# Patient Record
(demographics unavailable — no encounter records)

---

## 2025-04-11 NOTE — ASSESSMENT
[FreeTextEntry1] : Mr. Pa is a 77-year-old RH gentleman with PMH of DM, HTN, R eye blindness, and HLD, who presents today, accompanied by brother, for a hospital follow up visit.  # TIA vs. Cervical radiculopathy. Initially presented to the ED on 04/04 with L hand weakness. Per patient, upon awakening could not move his LUE. He further endorses he had paresthesias and gas pains that radiated up to his chest. Found to have new onset HFrEF during admission. MRI: No evidence of acute intracranial pathology. CTH: No acute intracranial process. CTP: No perfusion deficits. TTE: EF 35-40%, (+) small PFO R->L shunt. EKG: SR w/ intraventricular conduction block. HgA1C: 9.2%, LDL: 87, TSH: 3.72. NIHSS: 0 mRS: 2-3.   Plan - Cont. DAPT as prescribed x 21 days, (04/27/25), then ASA monotherapy thereafter - Cont. Atorvastatin as prescribed, LDL goal <70 (87) - Optimize HgA1C goal <7.0% (9.2%) - Obtain MRI cervical spine to r/o underlying radiculopathy  - Referral to cardio for HFrEF for GDMT/PFO eval. - Cont. 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 Severe stenoses at the origins of the bilateral vertebral arteries. Asymptomatic - ADDISON Referral/Dr. Garcia   F/U in 4 mos.

## 2025-04-11 NOTE — DATA REVIEWED
[de-identified] : MRI: No evidence of acute intracranial pathology. No acute infarct, intracranial hemorrhage, mass effect or midline shift.  Mild chronic microvascular type changes.  [de-identified] : 04/04/2025 CTH: No CT evidence of large acute territorial infarct or acute intracranial pathology.   CT PERFUSION: No evidence of perfusion abnormality.  CTA HEAD:  A 1.5 mm saccular aneurysm is noted involving the right cervical ICA, in retrospect stable.  Intracranial vessels are otherwise patent and unremarkable.  CTA NECK: Severe stenoses at the origins of the bilateral vertebral arteries.  Mild stenosis of the proximal left internal carotid artery due to mixed calcified and noncalcified atherosclerotic plaque.      TTE: 1. Moderately decreased global left ventricular systolic function.  2. Left ventricular ejection fraction, by visual estimation, is 35 to 40%.  3. Spectral Doppler shows impaired relaxation pattern of left ventricular myocardial filling (Grade I diastolic dysfunction).  4. Normal right ventricular size and function.  5. Sclerotic aortic valve with normal opening.  6. Adequate TR velocity was not obtained to accurately assess RVSP.  7. There is no evidence of pericardial effusion.  8. Intravenous injection of agitated saline demonstrates the presence of a patent foramen ovale.  9. Small patent foramen ovale, with predominantly right to left shunting across the atrial septum.   EKG: SR w/ intraventricular conduction block

## 2025-04-11 NOTE — HISTORY OF PRESENT ILLNESS
[FreeTextEntry1] : Mr. Pa is a 77-year-old RH gentleman with PMH of DM, HTN, R eye blindness, and HLD, who presents today, accompanied by brother, for a hospital follow up visit.  Patient initially presented to the ED on 04/04 with L hand weakness. Per patient, upon awakening could not move his LUE. He further endorses he had paresthesias and gas pains that radiated up to his chest. Initial NIHSS 7 (L arm weakness, chronic R visual field deficit, b/l LE weakness, and L-sided numbness). Neurological workup was negative for acute infarct but revealed a 1.5 mm R cervical ICA aneurysm, severe stenoses at the origins of the b/l VA and mild stenosis of the proximal L ICA. Workup further revealed a small PFO with R->L shunt. Additionally, he was found to have new onset HFrEF. As per neurology team eval, symptoms were perceived to be related to possible TIA vs cervical radiculopathy. Patient was d/c on DAPT x 21 days and high intensity statin therapy. PTA was not on any ATP/AC therapy.   Since discharge, denies any new symptoms. Endorses neck pain ~ 1 mo. Denies any recent injury or trauma. Endorses compliance with medications, tolerating well w/o issue. Pending start outpatient PT this week. Lives alone. Has HHA 8H day/7 days a week. Walks with can at home but utilizes wheelchair for longer distances. States he is independent of showering and toileting. Able to feed self. Pending appt with PCP on Monday. States PCP typically checks b/w every 3 mos. Has been following a soft diet as he got new dentures but denies any difficulty swallowing. States f/s at home are around 160s-200s. Does not monitor BP. Never smoker. Denies any hx of alcohol or drug use. Denies and family hx of aneurysms or sudden unexplained death. Admits to snoring loudly at night and daytime sleepiness. Never evaluated for FLORENTINO. Denies any further complaints.